# Patient Record
Sex: MALE | URBAN - METROPOLITAN AREA
[De-identification: names, ages, dates, MRNs, and addresses within clinical notes are randomized per-mention and may not be internally consistent; named-entity substitution may affect disease eponyms.]

---

## 2020-10-07 ENCOUNTER — OFFICE VISIT (OUTPATIENT)
Dept: URBAN - METROPOLITAN AREA CLINIC 54 | Facility: CLINIC | Age: 60
End: 2020-10-07
Payer: MEDICAID

## 2020-10-07 DIAGNOSIS — H35.3122 NONEXUDATIVE AGE-RELATED MACULAR DEGENERATION, LEFT EYE, INTERMEDIATE DRY STAGE: ICD-10-CM

## 2020-10-07 DIAGNOSIS — H35.3211 EXDTVE AGE-REL MCLR DEGN, RIGHT EYE, WITH ACTV CHRDL NEOVAS: Primary | ICD-10-CM

## 2020-10-07 PROCEDURE — 99213 OFFICE O/P EST LOW 20 MIN: CPT | Performed by: OPHTHALMOLOGY

## 2020-10-07 PROCEDURE — 92134 CPTRZ OPH DX IMG PST SGM RTA: CPT | Performed by: OPHTHALMOLOGY

## 2020-10-07 PROCEDURE — 67028 INJECTION EYE DRUG: CPT | Performed by: OPHTHALMOLOGY

## 2020-10-07 ASSESSMENT — INTRAOCULAR PRESSURE
OS: 15
OD: 14

## 2020-10-07 NOTE — IMPRESSION/PLAN
Impression: Exudative age-related macular degeneration, right eye, with active choroidal neovascularization: H35.3211. OCT OU= CNV with tr SRF  +11 OD, no SRF/IRF  +4 IVFA = leak c/w CNV OD, no leak OS (11/14/17)
s/p Avastin 5/30/18 OD
s/p DANGELO OD 7/8/20 Plan: H/o increasing fluid OD on exam/OCT 4m after injection. Stable at 3m with good vision, although 20/60 today. May consider tighter interval.  D/w patient. Discussed R,B,A of Avastin vs Lucentis vs Eylea injection. Discussed signs and symptoms of retinal detachment. Patient understands and wishes to proceed with Lucentis injection today. Timeout was performed before procedure. After 2% Subconjunctival anesthesia & betadine prep, Lucentis was injected OD with no complications. Overfill discarded. 

2-3m OCT OU re-eval Lucentis OD

## 2020-10-07 NOTE — IMPRESSION/PLAN
Impression: Nonexudative age-related macular degeneration, left eye, intermediate dry stage: H35.3122. Plan: Discussed diagnosis with patient. No smoking. Pt to take vitamins approved in the AREDS2 study & continue to monitor AG on a daily basis. If any changes w/ AG call and RTC ASAP.

## 2020-12-16 ENCOUNTER — OFFICE VISIT (OUTPATIENT)
Dept: URBAN - METROPOLITAN AREA CLINIC 54 | Facility: CLINIC | Age: 60
End: 2020-12-16
Payer: MEDICAID

## 2020-12-16 PROCEDURE — 92134 CPTRZ OPH DX IMG PST SGM RTA: CPT | Performed by: OPHTHALMOLOGY

## 2020-12-16 PROCEDURE — 92014 COMPRE OPH EXAM EST PT 1/>: CPT | Performed by: OPHTHALMOLOGY

## 2020-12-16 PROCEDURE — 67028 INJECTION EYE DRUG: CPT | Performed by: OPHTHALMOLOGY

## 2020-12-16 ASSESSMENT — INTRAOCULAR PRESSURE
OD: 13
OS: 12

## 2020-12-16 NOTE — IMPRESSION/PLAN
Impression: Exudative age-related macular degeneration, right eye, with active choroidal neovascularization: H35.3211. OCT OU= CNV with tr SRF  +11 OD, no SRF/IRF  +4 IVFA = leak c/w CNV OD, no leak OS (11/14/17)
s/p Avastin 5/30/18 OD
s/p DANGELO OD 7/8/20 Plan: H/o increasing fluid OD on exam/OCT 4m after injection. Stable at 3m with good vision. D/w patient. Rec treatment today at 2-3m interval.

Discussed R,B,A of Avastin vs Lucentis vs Eylea injection. Discussed signs and symptoms of retinal detachment. Patient understands and wishes to proceed with Lucentis injection today. Timeout was performed before procedure. After 2% Subconjunctival anesthesia & betadine prep, Lucentis was injected OD with no complications. Overfill discarded. 

2-3m OCT OU re-eval Lucentis OD

## 2021-03-10 ENCOUNTER — OFFICE VISIT (OUTPATIENT)
Dept: URBAN - METROPOLITAN AREA CLINIC 54 | Facility: CLINIC | Age: 61
End: 2021-03-10
Payer: MEDICAID

## 2021-03-10 PROCEDURE — 67028 INJECTION EYE DRUG: CPT | Performed by: OPHTHALMOLOGY

## 2021-03-10 PROCEDURE — 99213 OFFICE O/P EST LOW 20 MIN: CPT | Performed by: OPHTHALMOLOGY

## 2021-03-10 PROCEDURE — 92134 CPTRZ OPH DX IMG PST SGM RTA: CPT | Performed by: OPHTHALMOLOGY

## 2021-03-10 ASSESSMENT — INTRAOCULAR PRESSURE
OD: 13
OS: 9

## 2021-03-10 NOTE — IMPRESSION/PLAN
Impression: Exudative age-related macular degeneration, right eye, with active choroidal neovascularization: H35.3211. OCT OU= CNV with tr SRF  OD, no SRF/IRF  IVFA = leak c/w CNV OD, no leak OS (11/14/17)
s/p Avastin 5/30/18 OD
s/p DANGELO OD 12/16/20 Plan: H/o increasing fluid OD on exam/OCT 4m after injection. Stable at 3m with mild SRF and good vision. D/w patient. Rec treatment today at 2-3m interval.

Discussed R,B,A of Avastin vs Lucentis vs Eylea injection. Discussed signs and symptoms of retinal detachment. Patient understands and wishes to proceed with Lucentis injection today. Timeout was performed before procedure. After 2% Subconjunctival anesthesia & betadine prep, Lucentis was injected OD with no complications. Overfill discarded. 

2-3m OCT OU re-eval Lucentis OD

## 2021-06-09 ENCOUNTER — OFFICE VISIT (OUTPATIENT)
Dept: URBAN - METROPOLITAN AREA CLINIC 54 | Facility: CLINIC | Age: 61
End: 2021-06-09
Payer: MEDICAID

## 2021-06-09 DIAGNOSIS — H26.9 CATARACT: ICD-10-CM

## 2021-06-09 PROCEDURE — 67028 INJECTION EYE DRUG: CPT | Performed by: OPHTHALMOLOGY

## 2021-06-09 PROCEDURE — 92134 CPTRZ OPH DX IMG PST SGM RTA: CPT | Performed by: OPHTHALMOLOGY

## 2021-06-09 PROCEDURE — 99213 OFFICE O/P EST LOW 20 MIN: CPT | Performed by: OPHTHALMOLOGY

## 2021-06-09 ASSESSMENT — INTRAOCULAR PRESSURE
OD: 13
OS: 12

## 2021-06-09 NOTE — IMPRESSION/PLAN
Impression: Exudative age-related macular degeneration, right eye, with active choroidal neovascularization: H35.6798. OCT OU= PED tr SRF OD, no SRF/IRF  / 266 IVFA = leak c/w CNV OD, no leak OS (11/14/17)
s/p Avastin 5/30/18 OD
s/p DANGELO OD 03/10/2021 Plan: H/o increasing fluid OD on exam/OCT 4m after injection. Stable at 3m with mild SRF and good vision. D/w patient. Rec treatment today at 2-3m interval.

Discussed R,B,A of Avastin vs Lucentis vs Eylea injection. Discussed signs and symptoms of retinal detachment. Patient understands and wishes to proceed with Lucentis injection today. Timeout was performed before procedure. After 2% Subconjunctival anesthesia & betadine prep, Lucentis was injected OD with no complications. Overfill discarded. 

2-3m OCT OU re-eval Lucentis OD

## 2021-09-08 ENCOUNTER — OFFICE VISIT (OUTPATIENT)
Dept: URBAN - METROPOLITAN AREA CLINIC 54 | Facility: CLINIC | Age: 61
End: 2021-09-08
Payer: MEDICAID

## 2021-09-08 PROCEDURE — 92134 CPTRZ OPH DX IMG PST SGM RTA: CPT | Performed by: OPHTHALMOLOGY

## 2021-09-08 PROCEDURE — 67028 INJECTION EYE DRUG: CPT | Performed by: OPHTHALMOLOGY

## 2021-09-08 PROCEDURE — 92014 COMPRE OPH EXAM EST PT 1/>: CPT | Performed by: OPHTHALMOLOGY

## 2021-09-08 ASSESSMENT — INTRAOCULAR PRESSURE
OD: 18
OS: 16

## 2021-09-08 NOTE — IMPRESSION/PLAN
Impression: Exudative age-related macular degeneration, right eye, with active choroidal neovascularization: H35.3211. OCT OU= PED with increased, tr SRF OD, no SRF/IRF  / 266 IVFA = leak c/w CNV OD, no leak OS (11/14/17)
s/p Avastin 5/30/18 OD
s/p DANGELO OD 06/09/2021 Plan: H/o increasing fluid OD on exam/OCT 4m after injection. Stable at 3m with mild SRF and good vision. D/w patient. Rec treatment today at 2-3m interval.

Discussed R,B,A of Avastin vs Lucentis vs Eylea injection. Discussed signs and symptoms of retinal detachment. Patient understands and wishes to proceed with Lucentis injection today. Timeout was performed before procedure. After 2% Subconjunctival anesthesia & betadine prep, Lucentis was injected OD with no complications. Overfill discarded. 

2-3m OCT OU re-eval Lucentis OD

## 2022-01-19 ENCOUNTER — OFFICE VISIT (OUTPATIENT)
Dept: URBAN - METROPOLITAN AREA CLINIC 54 | Facility: CLINIC | Age: 62
End: 2022-01-19
Payer: MEDICAID

## 2022-01-19 PROCEDURE — 92134 CPTRZ OPH DX IMG PST SGM RTA: CPT | Performed by: OPHTHALMOLOGY

## 2022-01-19 PROCEDURE — 92014 COMPRE OPH EXAM EST PT 1/>: CPT | Performed by: OPHTHALMOLOGY

## 2022-01-19 PROCEDURE — 67028 INJECTION EYE DRUG: CPT | Performed by: OPHTHALMOLOGY

## 2022-01-19 ASSESSMENT — INTRAOCULAR PRESSURE
OD: 17
OS: 15

## 2022-01-19 NOTE — IMPRESSION/PLAN
Impression: Exudative age-related macular degeneration, right eye, with active choroidal neovascularization: H35.3211. OCT OU= PED with increased, tr SRF OD, no SRF/IRF /274 IVFA = leak c/w CNV OD, no leak OS (11/14/17)
s/p Avastin 5/30/18 OD
s/p DANGELO OD 09/08/2021 Plan: H/o increasing fluid OD on exam/OCT 4m after injection. Stable at 3m with mild SRF and good vision. D/w patient. Rec treatment today at 2-3m interval.

Discussed R,B,A of Avastin vs Lucentis vs Eylea injection. Discussed signs and symptoms of retinal detachment. Patient understands and wishes to proceed with Lucentis injection today. Timeout was performed before procedure. After 2% Subconjunctival anesthesia & betadine prep, Lucentis was injected OD with no complications. Overfill discarded. 

2-3m OCT OU re-eval Lucentis OD

## 2022-04-20 ENCOUNTER — OFFICE VISIT (OUTPATIENT)
Dept: URBAN - METROPOLITAN AREA CLINIC 54 | Facility: CLINIC | Age: 62
End: 2022-04-20
Payer: MEDICAID

## 2022-04-20 DIAGNOSIS — H35.3211 EXUDATIVE AGE-RELATED MACULAR DEGENERATION, RIGHT EYE, WITH ACTIVE CHOROIDAL NEOVASCULARIZATION: Primary | ICD-10-CM

## 2022-04-20 DIAGNOSIS — H35.3122 NONEXUDATIVE AGE-RELATED MACULAR DEGENERATION, LEFT EYE, INTERMEDIATE DRY STAGE: ICD-10-CM

## 2022-04-20 DIAGNOSIS — H26.9 CATARACT: ICD-10-CM

## 2022-04-20 PROCEDURE — 99214 OFFICE O/P EST MOD 30 MIN: CPT | Performed by: OPHTHALMOLOGY

## 2022-04-20 PROCEDURE — 92134 CPTRZ OPH DX IMG PST SGM RTA: CPT | Performed by: OPHTHALMOLOGY

## 2022-04-20 PROCEDURE — 67028 INJECTION EYE DRUG: CPT | Performed by: OPHTHALMOLOGY

## 2022-04-20 ASSESSMENT — INTRAOCULAR PRESSURE
OS: 13
OD: 13

## 2022-04-20 NOTE — IMPRESSION/PLAN
Impression: Exudative age-related macular degeneration, right eye, with active choroidal neovascularization: H35.3211. OCT OU= PED with increased, tr SRF OD, no SRF/IRF OS  / 270 IVFA = leak c/w CNV OD, no leak OS (11/14/17)
s/p Avastin 5/30/18 OD
s/p Lucentis OD - 01/19/2022  Plan: H/o increasing fluid OD on exam/OCT 4m after injection. Stable at 3m with mild SRF and good vision. D/w patient. Rec treatment today at 2-3m interval.  Would consider switch to [employer] Discussed R,B,A of Avastin vs Lucentis vs Eylea injection. Discussed signs and symptoms of retinal detachment. Patient understands and wishes to proceed with Lucentis injection today. Timeout was performed before procedure. After 2% Subconjunctival anesthesia & betadine prep, Lucentis was injected OD with no complications. Overfill discarded. 

2-3m OCT OU re-eval Lucentis vs Vabysmo OD

## 2022-08-10 ENCOUNTER — OFFICE VISIT (OUTPATIENT)
Dept: URBAN - METROPOLITAN AREA CLINIC 54 | Facility: CLINIC | Age: 62
End: 2022-08-10
Payer: MEDICAID

## 2022-08-10 DIAGNOSIS — H26.9 CATARACT: ICD-10-CM

## 2022-08-10 DIAGNOSIS — H35.3211 EXUDATIVE AGE-RELATED MACULAR DEGENERATION, RIGHT EYE, WITH ACTIVE CHOROIDAL NEOVASCULARIZATION: Primary | ICD-10-CM

## 2022-08-10 DIAGNOSIS — H35.3122 NONEXUDATIVE AGE-RELATED MACULAR DEGENERATION, LEFT EYE, INTERMEDIATE DRY STAGE: ICD-10-CM

## 2022-08-10 PROCEDURE — 99214 OFFICE O/P EST MOD 30 MIN: CPT | Performed by: OPHTHALMOLOGY

## 2022-08-10 PROCEDURE — 67028 INJECTION EYE DRUG: CPT | Performed by: OPHTHALMOLOGY

## 2022-08-10 PROCEDURE — 92134 CPTRZ OPH DX IMG PST SGM RTA: CPT | Performed by: OPHTHALMOLOGY

## 2022-08-10 ASSESSMENT — INTRAOCULAR PRESSURE
OD: 10
OS: 9

## 2022-08-10 NOTE — IMPRESSION/PLAN
Impression: Exudative age-related macular degeneration, right eye, with active choroidal neovascularization: H35.9379. OCT OU= PED with mild SRF OD, no SRF/IRF OS  / 273 IVFA = leak c/w CNV OD, no leak OS (11/14/17)
s/p Avastin 5/30/18 OD
s/p Lucentis OD - 04/20/2022 Plan: H/o increasing fluid OD on exam/OCT 4m after injection. Stable at 3m with mild SRF and good vision. D/w patient. Rec treatment today at 2-3m interval with switch to ALAINA AGUILA JR. [facility] [place] Is planning to [place] and would like 6m duration. May consider PDS. D/w patient. Discussed R,B,A of Avastin vs Lucentis vs Eylea vs Beovu vs Vabysmo injection. Discussed signs and symptoms of inflammation, endophthalmitis, vitreous hemorrhage, retinal tear, retinal detachment. Patient understands and wishes to proceed with Vabysmo injection today. Timeout was performed before procedure. After 2% Subconjunctival anesthesia & betadine prep, Vabysmo was injected with no complications. Overfill discarded. 

3m OCT OU re-eval Vabysmo OD

## 2022-11-23 ENCOUNTER — OFFICE VISIT (OUTPATIENT)
Dept: URBAN - METROPOLITAN AREA CLINIC 54 | Facility: CLINIC | Age: 62
End: 2022-11-23
Payer: MEDICAID

## 2022-11-23 DIAGNOSIS — H26.9 CATARACT: ICD-10-CM

## 2022-11-23 DIAGNOSIS — H35.3211 EXUDATIVE AGE-RELATED MACULAR DEGENERATION, RIGHT EYE, WITH ACTIVE CHOROIDAL NEOVASCULARIZATION: Primary | ICD-10-CM

## 2022-11-23 DIAGNOSIS — H35.3122 NONEXUDATIVE AGE-RELATED MACULAR DEGENERATION, LEFT EYE, INTERMEDIATE DRY STAGE: ICD-10-CM

## 2022-11-23 PROCEDURE — 67028 INJECTION EYE DRUG: CPT | Performed by: OPHTHALMOLOGY

## 2022-11-23 PROCEDURE — 92134 CPTRZ OPH DX IMG PST SGM RTA: CPT | Performed by: OPHTHALMOLOGY

## 2022-11-23 ASSESSMENT — INTRAOCULAR PRESSURE
OS: 10
OD: 13

## 2022-11-23 NOTE — IMPRESSION/PLAN
Impression: Exudative age-related macular degeneration, right eye, with active choroidal neovascularization: H35.1321. OCT OU= PED with mild SRF OD, no SRF/IRF OS  / 273 IVFA = leak c/w CNV OD, no leak OS (11/14/17)
s/p Avastin 5/30/18 OD
s/p Lucentis OD - 04/20/2022
s/p Vabysmo OD - 08/10/2022 Plan: H/o increasing fluid OD on exam/OCT 4m after injection. Stable at 3m with mild SRF and good vision. D/w patient. Rec treatment today at 2-3m interval with switch to ALAINA AGUILA JR. [facility] Is planning to retire [place] and would like 6m duration. May consider PDS. D/w patient. Discussed R,B,A of Avastin vs Lucentis vs Eylea vs Beovu vs Vabysmo injection. Discussed signs and symptoms of inflammation, endophthalmitis, vitreous hemorrhage, retinal tear, retinal detachment. Patient understands and wishes to proceed with Vabysmo injection today. Timeout was performed before procedure. After 2% Subconjunctival anesthesia & betadine prep, Vabysmo was injected with no complications. Overfill discarded. 

3m OCT OU/Vabysmo OD (straight)
then 3m OCT OU re-eval Vabysmo OD

## 2023-02-24 ENCOUNTER — PROCEDURE (OUTPATIENT)
Dept: URBAN - METROPOLITAN AREA CLINIC 54 | Facility: CLINIC | Age: 63
End: 2023-02-24
Payer: MEDICAID

## 2023-02-24 DIAGNOSIS — H35.3211 EXUDATIVE AGE-RELATED MACULAR DEGENERATION, RIGHT EYE, WITH ACTIVE CHOROIDAL NEOVASCULARIZATION: Primary | ICD-10-CM

## 2023-02-24 PROCEDURE — 92134 CPTRZ OPH DX IMG PST SGM RTA: CPT | Performed by: OPHTHALMOLOGY

## 2023-02-24 PROCEDURE — 67028 INJECTION EYE DRUG: CPT | Performed by: OPHTHALMOLOGY

## 2023-02-24 ASSESSMENT — INTRAOCULAR PRESSURE
OD: 18
OS: 15

## 2023-06-02 ENCOUNTER — OFFICE VISIT (OUTPATIENT)
Dept: URBAN - METROPOLITAN AREA CLINIC 54 | Facility: CLINIC | Age: 63
End: 2023-06-02
Payer: MEDICAID

## 2023-06-02 DIAGNOSIS — H35.3122 NONEXUDATIVE AGE-RELATED MACULAR DEGENERATION, LEFT EYE, INTERMEDIATE DRY STAGE: ICD-10-CM

## 2023-06-02 DIAGNOSIS — H35.3211 EXUDATIVE AGE-RELATED MACULAR DEGENERATION, RIGHT EYE, WITH ACTIVE CHOROIDAL NEOVASCULARIZATION: Primary | ICD-10-CM

## 2023-06-02 DIAGNOSIS — H26.9 CATARACT: ICD-10-CM

## 2023-06-02 PROCEDURE — 92134 CPTRZ OPH DX IMG PST SGM RTA: CPT | Performed by: OPHTHALMOLOGY

## 2023-06-02 PROCEDURE — 99213 OFFICE O/P EST LOW 20 MIN: CPT | Performed by: OPHTHALMOLOGY

## 2023-06-02 PROCEDURE — 67028 INJECTION EYE DRUG: CPT | Performed by: OPHTHALMOLOGY

## 2023-06-02 ASSESSMENT — INTRAOCULAR PRESSURE
OS: 13
OD: 14

## 2023-06-02 NOTE — IMPRESSION/PLAN
Impression: Exudative age-related macular degeneration, right eye, with active choroidal neovascularization: H35.8163. OCT OU= PED with mild SRF OD, no SRF/IRF OS  / 271 IVFA = leak c/w CNV OD, no leak OS (11/14/17)
s/p Avastin 5/30/18 OD
s/p Lucentis OD - 04/20/2022
s/p Vabysmo OD - 02/24/2023 Plan: H/o increasing fluid OD on exam/OCT 4m after injection. Stable at 3m with mild SRF and good vision. D/w patient. Rec treatment today at 2-3m interval with switch to ALAINA AGUILA JR. [facility] [place] Is planning to [place] and would like 6m duration. May consider PDS. D/w patient. Discussed R,B,A of Avastin vs Lucentis vs Eylea vs Beovu vs Vabysmo injection. Discussed signs and symptoms of inflammation, endophthalmitis, vitreous hemorrhage, retinal tear, retinal detachment. Patient understands and wishes to proceed with Vabysmo injection today. Timeout was performed before procedure. After 2% Subconjunctival anesthesia & betadine prep, Vabysmo was injected with no complications. Overfill discarded. 

3m OCT OU/Vabysmo OD (straight)
then 3m OCT OU re-eval Vabysmo OD

## 2023-09-26 ENCOUNTER — OFFICE VISIT (OUTPATIENT)
Dept: URBAN - METROPOLITAN AREA CLINIC 13 | Facility: CLINIC | Age: 63
End: 2023-09-26
Payer: COMMERCIAL

## 2023-09-26 DIAGNOSIS — H35.3211 EXUDATIVE AGE-RELATED MACULAR DEGENERATION, RIGHT EYE, WITH ACTIVE CHOROIDAL NEOVASCULARIZATION: Primary | ICD-10-CM

## 2023-09-26 PROCEDURE — 67028 INJECTION EYE DRUG: CPT | Performed by: OPHTHALMOLOGY

## 2023-09-26 PROCEDURE — 92134 CPTRZ OPH DX IMG PST SGM RTA: CPT | Performed by: OPHTHALMOLOGY

## 2023-09-26 ASSESSMENT — INTRAOCULAR PRESSURE
OS: 12
OD: 13

## 2024-01-24 ENCOUNTER — OFFICE VISIT (OUTPATIENT)
Dept: URBAN - METROPOLITAN AREA CLINIC 13 | Facility: CLINIC | Age: 64
End: 2024-01-24
Payer: COMMERCIAL

## 2024-01-24 DIAGNOSIS — H35.3211 EXUDATIVE AGE-RELATED MACULAR DEGENERATION, RIGHT EYE, WITH ACTIVE CHOROIDAL NEOVASCULARIZATION: Primary | ICD-10-CM

## 2024-01-24 DIAGNOSIS — H26.9 CATARACT: ICD-10-CM

## 2024-01-24 DIAGNOSIS — H35.3122 NONEXUDATIVE AGE-RELATED MACULAR DEGENERATION, LEFT EYE, INTERMEDIATE DRY STAGE: ICD-10-CM

## 2024-01-24 PROCEDURE — 99214 OFFICE O/P EST MOD 30 MIN: CPT | Performed by: OPHTHALMOLOGY

## 2024-01-24 PROCEDURE — 92134 CPTRZ OPH DX IMG PST SGM RTA: CPT | Performed by: OPHTHALMOLOGY

## 2024-01-24 PROCEDURE — 67028 INJECTION EYE DRUG: CPT | Performed by: OPHTHALMOLOGY

## 2024-01-24 ASSESSMENT — INTRAOCULAR PRESSURE
OS: 17
OD: 19

## 2024-04-17 ENCOUNTER — OFFICE VISIT (OUTPATIENT)
Dept: URBAN - METROPOLITAN AREA CLINIC 13 | Facility: CLINIC | Age: 64
End: 2024-04-17
Payer: COMMERCIAL

## 2024-04-17 DIAGNOSIS — H35.3211 EXUDATIVE AGE-RELATED MACULAR DEGENERATION, RIGHT EYE, WITH ACTIVE CHOROIDAL NEOVASCULARIZATION: Primary | ICD-10-CM

## 2024-04-17 PROCEDURE — 67028 INJECTION EYE DRUG: CPT | Performed by: OPHTHALMOLOGY

## 2024-04-17 PROCEDURE — 92134 CPTRZ OPH DX IMG PST SGM RTA: CPT | Performed by: OPHTHALMOLOGY

## 2024-04-17 ASSESSMENT — INTRAOCULAR PRESSURE
OD: 14
OS: 12

## 2024-07-10 ENCOUNTER — OFFICE VISIT (OUTPATIENT)
Dept: URBAN - METROPOLITAN AREA CLINIC 13 | Facility: CLINIC | Age: 64
End: 2024-07-10
Payer: COMMERCIAL

## 2024-07-10 DIAGNOSIS — H35.3211 EXUDATIVE AGE-RELATED MACULAR DEGENERATION, RIGHT EYE, WITH ACTIVE CHOROIDAL NEOVASCULARIZATION: Primary | ICD-10-CM

## 2024-07-10 DIAGNOSIS — H26.9 CATARACT: ICD-10-CM

## 2024-07-10 DIAGNOSIS — H35.3122 NONEXUDATIVE AGE-RELATED MACULAR DEGENERATION, LEFT EYE, INTERMEDIATE DRY STAGE: ICD-10-CM

## 2024-07-10 PROCEDURE — 92134 CPTRZ OPH DX IMG PST SGM RTA: CPT | Performed by: OPHTHALMOLOGY

## 2024-07-10 PROCEDURE — 67028 INJECTION EYE DRUG: CPT | Performed by: OPHTHALMOLOGY

## 2024-07-10 PROCEDURE — 99213 OFFICE O/P EST LOW 20 MIN: CPT | Performed by: OPHTHALMOLOGY

## 2024-07-10 ASSESSMENT — INTRAOCULAR PRESSURE
OD: 16
OS: 12

## 2024-10-16 ENCOUNTER — OFFICE VISIT (OUTPATIENT)
Dept: URBAN - METROPOLITAN AREA CLINIC 13 | Facility: CLINIC | Age: 64
End: 2024-10-16
Payer: COMMERCIAL

## 2024-10-16 DIAGNOSIS — H35.3211 EXUDATIVE AGE-RELATED MACULAR DEGENERATION, RIGHT EYE, WITH ACTIVE CHOROIDAL NEOVASCULARIZATION: Primary | ICD-10-CM

## 2024-10-16 PROCEDURE — 92134 CPTRZ OPH DX IMG PST SGM RTA: CPT | Performed by: OPHTHALMOLOGY

## 2024-10-16 PROCEDURE — 67028 INJECTION EYE DRUG: CPT | Performed by: OPHTHALMOLOGY

## 2024-10-16 ASSESSMENT — INTRAOCULAR PRESSURE
OD: 17
OS: 12

## 2025-01-22 ENCOUNTER — OFFICE VISIT (OUTPATIENT)
Dept: URBAN - METROPOLITAN AREA CLINIC 13 | Facility: CLINIC | Age: 65
End: 2025-01-22
Payer: COMMERCIAL

## 2025-01-22 DIAGNOSIS — H35.3211 EXUDATIVE AGE-RELATED MACULAR DEGENERATION, RIGHT EYE, WITH ACTIVE CHOROIDAL NEOVASCULARIZATION: Primary | ICD-10-CM

## 2025-01-22 DIAGNOSIS — H26.9 CATARACT: ICD-10-CM

## 2025-01-22 DIAGNOSIS — H35.3122 NONEXUDATIVE AGE-RELATED MACULAR DEGENERATION, LEFT EYE, INTERMEDIATE DRY STAGE: ICD-10-CM

## 2025-01-22 PROCEDURE — 92134 CPTRZ OPH DX IMG PST SGM RTA: CPT | Performed by: OPHTHALMOLOGY

## 2025-01-22 PROCEDURE — 67028 INJECTION EYE DRUG: CPT | Performed by: OPHTHALMOLOGY

## 2025-01-22 PROCEDURE — 99213 OFFICE O/P EST LOW 20 MIN: CPT | Performed by: OPHTHALMOLOGY

## 2025-01-22 ASSESSMENT — INTRAOCULAR PRESSURE
OD: 17
OS: 14

## 2025-04-22 ENCOUNTER — OFFICE VISIT (OUTPATIENT)
Dept: URBAN - METROPOLITAN AREA CLINIC 13 | Facility: CLINIC | Age: 65
End: 2025-04-22
Payer: COMMERCIAL

## 2025-04-22 DIAGNOSIS — H35.3211 EXUDATIVE AGE-RELATED MACULAR DEGENERATION, RIGHT EYE, WITH ACTIVE CHOROIDAL NEOVASCULARIZATION: Primary | ICD-10-CM

## 2025-04-22 PROCEDURE — 92134 CPTRZ OPH DX IMG PST SGM RTA: CPT | Performed by: OPHTHALMOLOGY

## 2025-04-22 PROCEDURE — 67028 INJECTION EYE DRUG: CPT | Performed by: OPHTHALMOLOGY

## 2025-04-22 ASSESSMENT — INTRAOCULAR PRESSURE
OS: 13
OD: 13

## 2025-07-22 ENCOUNTER — OFFICE VISIT (OUTPATIENT)
Dept: URBAN - METROPOLITAN AREA CLINIC 13 | Facility: CLINIC | Age: 65
End: 2025-07-22
Payer: COMMERCIAL

## 2025-07-22 DIAGNOSIS — H35.3122 NONEXUDATIVE AGE-RELATED MACULAR DEGENERATION, LEFT EYE, INTERMEDIATE DRY STAGE: Primary | ICD-10-CM

## 2025-07-22 DIAGNOSIS — H26.9 CATARACT: ICD-10-CM

## 2025-07-22 DIAGNOSIS — H35.3211 EXUDATIVE AGE-RELATED MACULAR DEGENERATION, RIGHT EYE, WITH ACTIVE CHOROIDAL NEOVASCULARIZATION: ICD-10-CM

## 2025-07-22 PROCEDURE — 99213 OFFICE O/P EST LOW 20 MIN: CPT | Performed by: OPHTHALMOLOGY

## 2025-07-22 PROCEDURE — 67028 INJECTION EYE DRUG: CPT | Performed by: OPHTHALMOLOGY

## 2025-07-22 PROCEDURE — 92134 CPTRZ OPH DX IMG PST SGM RTA: CPT | Performed by: OPHTHALMOLOGY

## 2025-07-22 ASSESSMENT — INTRAOCULAR PRESSURE
OS: 13
OD: 14